# Patient Record
Sex: FEMALE | Race: WHITE | URBAN - METROPOLITAN AREA
[De-identification: names, ages, dates, MRNs, and addresses within clinical notes are randomized per-mention and may not be internally consistent; named-entity substitution may affect disease eponyms.]

---

## 2024-02-20 ENCOUNTER — APPOINTMENT (RX ONLY)
Dept: URBAN - METROPOLITAN AREA CLINIC 4 | Facility: CLINIC | Age: 49
Setting detail: DERMATOLOGY
End: 2024-02-20

## 2024-02-20 DIAGNOSIS — L259 CONTACT DERMATITIS AND OTHER ECZEMA, UNSPECIFIED CAUSE: ICD-10-CM

## 2024-02-20 PROBLEM — L30.9 DERMATITIS, UNSPECIFIED: Status: ACTIVE | Noted: 2024-02-20

## 2024-02-20 PROCEDURE — ? PATCH TESTING

## 2024-02-20 PROCEDURE — 95044 PATCH/APPLICATION TESTS: CPT

## 2024-02-20 NOTE — PROCEDURE: PATCH TESTING
Consent: Verbal consent obtained, risks reviewed including but not limited to rash, itching, allergic reaction, systemic rash, remote possibility of anaphylaxis to allergen.
Post-Care Instructions: I reviewed with the patient in detail post-care instructions. Patient should not sweat, pick at, or get the patches wet for 48 hours.
Number Of Patches (Maximum Allowable Per Dos By Cms Is 90): 88
Detail Level: Zone

## 2024-02-22 ENCOUNTER — APPOINTMENT (RX ONLY)
Dept: URBAN - METROPOLITAN AREA CLINIC 4 | Facility: CLINIC | Age: 49
Setting detail: DERMATOLOGY
End: 2024-02-22

## 2024-02-22 DIAGNOSIS — L259 CONTACT DERMATITIS AND OTHER ECZEMA, UNSPECIFIED CAUSE: ICD-10-CM

## 2024-02-22 PROBLEM — L30.9 DERMATITIS, UNSPECIFIED: Status: ACTIVE | Noted: 2024-02-22

## 2024-02-22 PROCEDURE — ? CORE ACDS PATCH TEST READING

## 2024-02-22 PROCEDURE — 99212 OFFICE O/P EST SF 10 MIN: CPT

## 2024-02-22 NOTE — PROCEDURE: CORE ACDS PATCH TEST READING
Allergen 70 Reaction: no reaction
Name Of Allergen 71: triamcinalone acetonide
Name Of Allergen 63: sorbic acid
Name Of Allergen 79: 2-ethylhexyl-4-methoxycinnamate
Name Of Allergen 65: compositae mix
Detail Level: Zone
Name Of Allergen 22: mercapto mix A
Name Of Allergen 51: tea tree oil, oxidized
Name Of Allergen 86: lauryl glycoside
Name Of Allergen 66: dimethylol dihydroxyethyleneurea
Name Of Allergen 19: lyral
Name Of Allergen 60: benzalkonium chloride
Name Of Allergen 1: nickel sulfate hexahydrate
Name Of Allergen 81: benzyl salicylate
Name Of Allergen 18: quaternium 15
Name Of Allergen 8: paraben mix B
Name Of Allergen 43: 2-hydroxyethyl-methacrylate
Name Of Allergen 72: clobetasol-17-propionate
Name Of Allergen 77: benzoic acid
Name Of Allergen 23: 2-bromo-2-nitropropane-1,3-diol
Name Of Allergen 33: bacitracin
Name Of Allergen 13: petrolatum-tert- butylphenol formaldehyde resin
Name Of Allergen 34: fragrance mix 2
Name Of Allergen 73: amidoamine
Name Of Allergen 11: ethylenediamine dihydrochloride
Name Of Allergen 46: methyl methacrylate
Name Of Allergen 87: 4-chloro-3-cresol
Name Of Allergen 85: shellac
Name Of Allergen 14: bisphenol A epoxy resin
Name Of Allergen 38: iodopropyynyl betaine
Name Of Allergen 12: cobalt 2 chloride hexahydrate
Name Of Allergen 39: polymyxin B sulfate
Name Of Allergen 55: 2-hydroxy-4-methoxybenzophenone
Name Of Allergen 64: cananga odorata
Name Of Allergen 54: 4-chloro-3,5-xylenol
Name Of Allergen 45: decyl glucoside
Name Of Allergen 35: disperse blue mix 124/106
Name Of Allergen 75: phenoxyethanol
Name Of Allergen 47: lavandula angustifolia oil
Name Of Allergen 70: ethyl hexyl glycerol
Name Of Allergen 32: 2-mercaptobenzothiazole
Name Of Allergen 80: benzyl alcohol
Name Of Allergen 42: dimethylaminopropylamine
Name Of Allergen 9: methylisothiazolinone
Name Of Allergen 88: tab
Name Of Allergen 15: carba mix
Name Of Allergen 21: formaldehyde
What Reading Time Point?: 48 hour
Name Of Allergen 82: disperse yellow
Show Allergen Counseling In The Note?: Yes
Name Of Allergen 44: oleamidopropyl dimethylamine
Name Of Allergen 49: dl alpha tocopherol
Name Of Allergen 40: cocamidopropyl betaine
Name Of Allergen 67: sorbitan sesquioleate
Name Of Allergen 76: disperse orange-3
Name Of Allergen 27: tixocortol-21-pivalate
Name Of Allergen 36: lidocaine-hci
Allergen 34 Reaction: +/-
Name Of Allergen 24: thiuram mix A
Name Of Allergen 3: neomycin sulfate
Name Of Allergen 41: mixed dialkyl thioureas
Name Of Allergen 10: balsam of peru
Name Of Allergen 4: potassium dichromate
Name Of Allergen 61: benzophenone chloride
Name Of Allergen 37: propylene glycol
Name Of Allergen 7: colophony
Name Of Allergen 26: benzocaine
Name Of Allergen 62: sodium benzoate
Name Of Allergen 30: budesonide
Name Of Allergen 58: cocunut diethanolamide
Name Of Allergen 56: tosylamide formaldehyde resin
Name Of Allergen 68: 1,3-diphenylguanidine
Name Of Allergen 84: peppermint oil
Name Of Allergen 53: propolis
Number Of Patches Read: 88
Name Of Allergen 74: ethyl cyanoacrylate
Name Of Allergen 5: DMDM hydantoin
Name Of Allergen 16: black rubber mix
Name Of Allergen 17: methylchloroisothiazolinone/methylisothiazolinone
Name Of Allergen 28: gold sodium thiosulfate
Name Of Allergen 78: butylhydroxytoluene
Name Of Allergen 50: ethyl acrylate
Name Of Allergen 2: amerchol L101
Name Of Allergen 69: cetylstearyalcohol
Name Of Allergen 6: fragrance mix
Name Of Allergen 31: hydrocortisone-17-butyrate
Name Of Allergen 52: chlorhexidine diclugonate
Name Of Allergen 20: petrolatum-phenylenediamine
Name Of Allergen 29: imidazolidinyl urea
Name Of Allergen 83: jasminum ([place] devaughn)
Name Of Allergen 25: diazolidinyl urea
Name Of Allergen 48: cinnamic aldehyde
Name Of Allergen 57: sesquiterpenelactone mix

## 2024-02-27 ENCOUNTER — APPOINTMENT (RX ONLY)
Dept: URBAN - METROPOLITAN AREA CLINIC 4 | Facility: CLINIC | Age: 49
Setting detail: DERMATOLOGY
End: 2024-02-27

## 2024-02-27 DIAGNOSIS — L259 CONTACT DERMATITIS AND OTHER ECZEMA, UNSPECIFIED CAUSE: ICD-10-CM

## 2024-02-27 PROBLEM — L30.9 DERMATITIS, UNSPECIFIED: Status: ACTIVE | Noted: 2024-02-27

## 2024-02-27 PROCEDURE — 99212 OFFICE O/P EST SF 10 MIN: CPT

## 2024-02-27 PROCEDURE — ? CORE ACDS PATCH TEST READING

## 2024-02-27 PROCEDURE — ? ADDITIONAL NOTES

## 2024-02-27 NOTE — PROCEDURE: ADDITIONAL NOTES
Additional Notes: We completed patch testing on this patient who has had a dermatitis involving the face. We found positive reactions to: fragrance mix 2, and neomycin sulfate.\\nAllergens Showing 2+ Reactions: fragrance mix 2\\nAllergens Showing 1+ Reactions: neomycin sulfate\\nWe have reviewed the pertinent allergen data sheets with the patient, and provided copies for future reference.  Allergen data sheets have also been saved in the attachments section of the medical record.\\nThe patient has been provided with the ACDS [facility] kristin will be useful in purchasing products that do not contain the allergen. [employer] kristin codes: ([id number], and [id number]). \\nHopefully elimination of allergens will result in clearance of the dermatitis. Patient will follow up with the referring dermatologist Dr. Porras in 2 months.
Detail Level: Simple
Render Risk Assessment In Note?: no

## 2024-02-27 NOTE — PROCEDURE: CORE ACDS PATCH TEST READING
Name Of Allergen 34: fragrance mix 2
Name Of Allergen 64: cananga odorata
Name Of Allergen 23: 2-bromo-2-nitropropane-1,3-diol
Name Of Allergen 74: ethyl cyanoacrylate
Allergen 25 Reaction: no reaction
Name Of Allergen 54: 4-chloro-3,5-xylenol
Name Of Allergen 61: benzophenone chloride
Name Of Allergen 58: cocunut diethanolamide
Name Of Allergen 71: triamcinalone acetonide
Name Of Allergen 29: imidazolidinyl urea
Name Of Allergen 5: DMDM hydantoin
Name Of Allergen 88: tab
Name Of Allergen 13: petrolatum-tert- butylphenol formaldehyde resin
Name Of Allergen 28: gold sodium thiosulfate
Name Of Allergen 3: neomycin sulfate
Name Of Allergen 15: carba mix
Name Of Allergen 52: chlorhexidine diclugonate
Name Of Allergen 76: disperse orange-3
Name Of Allergen 82: disperse yellow
Name Of Allergen 10: balsam of peru
Name Of Allergen 22: mercapto mix A
Name Of Allergen 83: jasminum (Divehi devaughn)
Name Of Allergen 45: decyl glucoside
Name Of Allergen 12: cobalt 2 chloride hexahydrate
Name Of Allergen 26: benzocaine
Show Allergen Counseling In The Note?: Yes
Name Of Allergen 67: sorbitan sesquioleate
Name Of Allergen 78: butylhydroxytoluene
Name Of Allergen 59: 4-chloro-3-cresol
Name Of Allergen 35: disperse blue mix 124/106
Name Of Allergen 47: lavandula angustifolia oil
Name Of Allergen 41: mixed dialkyl thioureas
Name Of Allergen 49: dl alpha tocopherol
Name Of Allergen 4: potassium dichromate
Name Of Allergen 80: benzyl alcohol
Name Of Allergen 60: benzalkonium chloride
Name Of Allergen 65: compositae mix
Name Of Allergen 48: cinnamic aldehyde
Allergen 3 Reaction: 1+
Name Of Allergen 11: ethylenediamine dihydrochloride
Name Of Allergen 73: amidoamine
Name Of Allergen 7: colophony
Name Of Allergen 62: sodium benzoate
Name Of Allergen 44: oleamidopropyl dimethylamine
Name Of Allergen 25: diazolidinyl urea
Name Of Allergen 72: clobetasol-17-propionate
Name Of Allergen 1: nickel sulfate hexahydrate
Name Of Allergen 81: benzyl saliculate
Name Of Allergen 86: lauryl glycoside
Name Of Allergen 84: peppermint oil
Name Of Allergen 40: cocamidopropyl betaine
Name Of Allergen 37: propylene glycol
Name Of Allergen 14: bisphenol A epoxy resin
Name Of Allergen 20: petrolatum-phenylenediamine
Name Of Allergen 51: tea tree oil, oxidized
Name Of Allergen 16: black rubber mix
Name Of Allergen 36: lidocaine-hci
Name Of Allergen 55: 2-hydroxy-4-methoxybenzophenone
Name Of Allergen 9: methylisothiazolinone
Name Of Allergen 27: tixocortol-21-pivalate
Name Of Allergen 63: sorbic acid
Name Of Allergen 77: benzoic acid
Detail Level: Zone
Name Of Allergen 79: 2-ethylhexyl-4-methoxycinnamate
Name Of Allergen 42: dimethylaminopropylamine
Name Of Allergen 31: hydrocortisone-17-butyrate
Name Of Allergen 43: 2-hydroxyethyl-methacrylate
Name Of Allergen 85: shellac
Name Of Allergen 68: 1,3-diphenylguanidine
Name Of Allergen 21: formaldehyde
Name Of Allergen 56: tosylamide formaldehyde resin
Name Of Allergen 33: bacitracin
Name Of Allergen 24: thiuram mix A
Name Of Allergen 70: ethyl hexyl glycerol
Allergen 34 Reaction: 2+
Name Of Allergen 8: paraben mix B
Name Of Allergen 69: cetylstearyalcohol
Number Of Patches Read: 88
Name Of Allergen 32: 2-mercaptobenzothiazole
What Reading Time Point?: 168 hour
Name Of Allergen 50: ethyl acrylate
Name Of Allergen 39: polymyxin B sulfate
Name Of Allergen 6: fragrance mix
Name Of Allergen 30: budesonide
Name Of Allergen 53: propolis
Name Of Allergen 19: lyral
Name Of Allergen 2: amerchol L101
Name Of Allergen 46: methyl methacrylate
Name Of Allergen 66: dimethylol dihydroxyethyleneurea
Name Of Allergen 38: iodopropyynyl betaine
Name Of Allergen 57: sesquiterpenelactone mix
Name Of Allergen 18: quaternium 15
Name Of Allergen 17: methylchloroisothiazolinone/methylisothiazolinone
Name Of Allergen 75: phenoxyethanol

## 2025-01-29 ENCOUNTER — HOSPITAL ENCOUNTER (OUTPATIENT)
Dept: LAB | Facility: MEDICAL CENTER | Age: 50
End: 2025-01-29
Attending: PHYSICIAN ASSISTANT
Payer: COMMERCIAL

## 2025-01-29 LAB
25(OH)D3 SERPL-MCNC: 46 NG/ML (ref 30–100)
ALBUMIN SERPL BCP-MCNC: 4.5 G/DL (ref 3.2–4.9)
ALBUMIN/GLOB SERPL: 1.7 G/DL
ALP SERPL-CCNC: 60 U/L (ref 30–99)
ALT SERPL-CCNC: 16 U/L (ref 2–50)
ANION GAP SERPL CALC-SCNC: 12 MMOL/L (ref 7–16)
APPEARANCE UR: CLEAR
AST SERPL-CCNC: 23 U/L (ref 12–45)
BASOPHILS # BLD AUTO: 0.5 % (ref 0–1.8)
BASOPHILS # BLD: 0.03 K/UL (ref 0–0.12)
BILIRUB SERPL-MCNC: 0.2 MG/DL (ref 0.1–1.5)
BILIRUB UR QL STRIP.AUTO: NEGATIVE
BUN SERPL-MCNC: 14 MG/DL (ref 8–22)
CALCIUM ALBUM COR SERPL-MCNC: 8.9 MG/DL (ref 8.5–10.5)
CALCIUM SERPL-MCNC: 9.3 MG/DL (ref 8.5–10.5)
CHLORIDE SERPL-SCNC: 102 MMOL/L (ref 96–112)
CHOLEST SERPL-MCNC: 167 MG/DL (ref 100–199)
CO2 SERPL-SCNC: 25 MMOL/L (ref 20–33)
COLOR UR: YELLOW
CREAT SERPL-MCNC: 0.99 MG/DL (ref 0.5–1.4)
EOSINOPHIL # BLD AUTO: 0.14 K/UL (ref 0–0.51)
EOSINOPHIL NFR BLD: 2.1 % (ref 0–6.9)
ERYTHROCYTE [DISTWIDTH] IN BLOOD BY AUTOMATED COUNT: 46.8 FL (ref 35.9–50)
EST. AVERAGE GLUCOSE BLD GHB EST-MCNC: 108 MG/DL
ESTRADIOL SERPL-MCNC: 24.4 PG/ML
GFR SERPLBLD CREATININE-BSD FMLA CKD-EPI: 70 ML/MIN/1.73 M 2
GLOBULIN SER CALC-MCNC: 2.7 G/DL (ref 1.9–3.5)
GLUCOSE SERPL-MCNC: 84 MG/DL (ref 65–99)
GLUCOSE UR STRIP.AUTO-MCNC: NEGATIVE MG/DL
HBA1C MFR BLD: 5.4 % (ref 4–5.6)
HCT VFR BLD AUTO: 43.6 % (ref 37–47)
HDLC SERPL-MCNC: 56 MG/DL
HGB BLD-MCNC: 14.3 G/DL (ref 12–16)
IMM GRANULOCYTES # BLD AUTO: 0.01 K/UL (ref 0–0.11)
IMM GRANULOCYTES NFR BLD AUTO: 0.2 % (ref 0–0.9)
KETONES UR STRIP.AUTO-MCNC: NEGATIVE MG/DL
LDLC SERPL CALC-MCNC: 86 MG/DL
LEUKOCYTE ESTERASE UR QL STRIP.AUTO: NEGATIVE
LYMPHOCYTES # BLD AUTO: 2.57 K/UL (ref 1–4.8)
LYMPHOCYTES NFR BLD: 38.9 % (ref 22–41)
MCH RBC QN AUTO: 31.5 PG (ref 27–33)
MCHC RBC AUTO-ENTMCNC: 32.8 G/DL (ref 32.2–35.5)
MCV RBC AUTO: 96 FL (ref 81.4–97.8)
MICRO URNS: NORMAL
MONOCYTES # BLD AUTO: 0.52 K/UL (ref 0–0.85)
MONOCYTES NFR BLD AUTO: 7.9 % (ref 0–13.4)
NEUTROPHILS # BLD AUTO: 3.33 K/UL (ref 1.82–7.42)
NEUTROPHILS NFR BLD: 50.4 % (ref 44–72)
NITRITE UR QL STRIP.AUTO: NEGATIVE
NRBC # BLD AUTO: 0 K/UL
NRBC BLD-RTO: 0 /100 WBC (ref 0–0.2)
PH UR STRIP.AUTO: 7.5 [PH] (ref 5–8)
PLATELET # BLD AUTO: 278 K/UL (ref 164–446)
PMV BLD AUTO: 9.9 FL (ref 9–12.9)
POTASSIUM SERPL-SCNC: 4.1 MMOL/L (ref 3.6–5.5)
PROGEST SERPL-MCNC: 0.34 NG/ML
PROT SERPL-MCNC: 7.2 G/DL (ref 6–8.2)
PROT UR QL STRIP: NEGATIVE MG/DL
RBC # BLD AUTO: 4.54 M/UL (ref 4.2–5.4)
RBC UR QL AUTO: NEGATIVE
SODIUM SERPL-SCNC: 139 MMOL/L (ref 135–145)
SP GR UR STRIP.AUTO: 1.01
T3FREE SERPL-MCNC: 2.76 PG/ML (ref 2–4.4)
T4 FREE SERPL-MCNC: 1.08 NG/DL (ref 0.93–1.7)
TRIGL SERPL-MCNC: 124 MG/DL (ref 0–149)
TSH SERPL-ACNC: 2.19 UIU/ML (ref 0.35–5.5)
UROBILINOGEN UR STRIP.AUTO-MCNC: 0.2 EU/DL
WBC # BLD AUTO: 6.6 K/UL (ref 4.8–10.8)

## 2025-01-29 PROCEDURE — 80061 LIPID PANEL: CPT

## 2025-01-29 PROCEDURE — 84403 ASSAY OF TOTAL TESTOSTERONE: CPT

## 2025-01-29 PROCEDURE — 84402 ASSAY OF FREE TESTOSTERONE: CPT

## 2025-01-29 PROCEDURE — 84481 FREE ASSAY (FT-3): CPT

## 2025-01-29 PROCEDURE — 83516 IMMUNOASSAY NONANTIBODY: CPT | Mod: 91

## 2025-01-29 PROCEDURE — 84443 ASSAY THYROID STIM HORMONE: CPT

## 2025-01-29 PROCEDURE — 84270 ASSAY OF SEX HORMONE GLOBUL: CPT

## 2025-01-29 PROCEDURE — 36415 COLL VENOUS BLD VENIPUNCTURE: CPT

## 2025-01-29 PROCEDURE — 82607 VITAMIN B-12: CPT

## 2025-01-29 PROCEDURE — 80053 COMPREHEN METABOLIC PANEL: CPT

## 2025-01-29 PROCEDURE — 84439 ASSAY OF FREE THYROXINE: CPT

## 2025-01-29 PROCEDURE — 82306 VITAMIN D 25 HYDROXY: CPT

## 2025-01-29 PROCEDURE — 84144 ASSAY OF PROGESTERONE: CPT

## 2025-01-29 PROCEDURE — 81003 URINALYSIS AUTO W/O SCOPE: CPT

## 2025-01-29 PROCEDURE — 85025 COMPLETE CBC W/AUTO DIFF WBC: CPT

## 2025-01-29 PROCEDURE — 83036 HEMOGLOBIN GLYCOSYLATED A1C: CPT

## 2025-01-29 PROCEDURE — 82670 ASSAY OF TOTAL ESTRADIOL: CPT

## 2025-01-30 LAB — VIT B12 SERPL-MCNC: 2886 PG/ML (ref 211–911)

## 2025-01-31 LAB
MYELOPEROXIDASE AB SER-ACNC: 0 AU/ML (ref 0–19)
PROTEINASE3 AB SER-ACNC: 0 AU/ML (ref 0–19)

## 2025-02-01 LAB
SHBG SERPL-SCNC: 43 NMOL/L (ref 25–122)
TESTOST FREE SERPL-MCNC: 35.3 PG/ML (ref 1.1–5.8)
TESTOST SERPL-MCNC: 234 NG/DL (ref 9–55)